# Patient Record
Sex: MALE | Race: WHITE
[De-identification: names, ages, dates, MRNs, and addresses within clinical notes are randomized per-mention and may not be internally consistent; named-entity substitution may affect disease eponyms.]

---

## 2020-02-26 PROBLEM — Z00.00 ENCOUNTER FOR PREVENTIVE HEALTH EXAMINATION: Status: ACTIVE | Noted: 2020-02-26

## 2020-02-28 ENCOUNTER — APPOINTMENT (OUTPATIENT)
Dept: PULMONOLOGY | Facility: CLINIC | Age: 70
End: 2020-02-28
Payer: MEDICARE

## 2020-02-28 VITALS
OXYGEN SATURATION: 93 % | WEIGHT: 211 LBS | HEIGHT: 75.5 IN | RESPIRATION RATE: 15 BRPM | BODY MASS INDEX: 25.96 KG/M2 | SYSTOLIC BLOOD PRESSURE: 99 MMHG | DIASTOLIC BLOOD PRESSURE: 69 MMHG | TEMPERATURE: 97.9 F | HEART RATE: 87 BPM

## 2020-02-28 PROCEDURE — 99205 OFFICE O/P NEW HI 60 MIN: CPT

## 2020-02-28 NOTE — ASSESSMENT
[FreeTextEntry1] : In summary the patient is a 69-year-old male with past medical history significant for chronic obstructive pulmonary disease who presents for a pulmonary consult. The patient's physical exam is significant for expiratory wheezing bilaterally.\par \par Spirometry as well as a home sleep study and Cardio pulmonary stress test  have been ordered. The patient is instructed to followup in 2 weeks

## 2020-02-28 NOTE — REASON FOR VISIT
[Consultation] : a consultation [COPD] : COPD [Cough] : cough [Wheezing] : wheezing [Shortness of Breath] : shortness of breath

## 2020-02-28 NOTE — PHYSICAL EXAM
[No Acute Distress] : no acute distress [Normal Oropharynx] : normal oropharynx [Normal Appearance] : normal appearance [No Neck Mass] : no neck mass [Normal Rate/Rhythm] : normal rate/rhythm [Normal S1, S2] : normal s1, s2 [No Murmurs] : no murmurs [No Resp Distress] : no resp distress [Rhonchi] : rhonchi [Benign] : benign [No Abnormalities] : no abnormalities [No Clubbing] : no clubbing [Normal Gait] : normal gait [No Cyanosis] : no cyanosis [No Edema] : no edema [FROM] : FROM [Normal Color/ Pigmentation] : normal color/ pigmentation [Oriented x3] : oriented x3 [No Focal Deficits] : no focal deficits [Normal Affect] : normal affect

## 2020-02-28 NOTE — REVIEW OF SYSTEMS
[Cough] : cough [Hemoptysis] : no hemoptysis [Chest Tightness] : chest tightness [Frequent URIs] : no frequent URIs [Dyspnea] : dyspnea [Sputum] : sputum [Pleuritic Pain] : pleuritic pain [Wheezing] : wheezing [A.M. Dry Mouth] : no a.m. dry mouth [SOB on Exertion] : sob on exertion [Negative] : Endocrine

## 2020-02-28 NOTE — HISTORY OF PRESENT ILLNESS
[Never] : never [TextBox_4] : Patient  is a 69-year-old male with past medical history significant for chronic obstructive pulmonary disease who presents for a pulmonary consult. The patient is a poor historian and the history is mostly from the chart. The patient presents to planning of increasing cough shortness of breath and dyspnea on exertion. He denies fevers chills chest pain weight loss or hemoptysis

## 2021-11-16 ENCOUNTER — APPOINTMENT (OUTPATIENT)
Dept: PULMONOLOGY | Facility: CLINIC | Age: 71
End: 2021-11-16
Payer: MEDICARE

## 2021-11-16 VITALS
OXYGEN SATURATION: 92 % | DIASTOLIC BLOOD PRESSURE: 68 MMHG | TEMPERATURE: 98.2 F | SYSTOLIC BLOOD PRESSURE: 104 MMHG | HEART RATE: 92 BPM | RESPIRATION RATE: 16 BRPM

## 2021-11-16 PROCEDURE — 99214 OFFICE O/P EST MOD 30 MIN: CPT

## 2021-11-23 ENCOUNTER — APPOINTMENT (OUTPATIENT)
Dept: PULMONOLOGY | Facility: CLINIC | Age: 71
End: 2021-11-23
Payer: MEDICARE

## 2021-11-23 PROCEDURE — 94729 DIFFUSING CAPACITY: CPT

## 2021-11-23 PROCEDURE — 94726 PLETHYSMOGRAPHY LUNG VOLUMES: CPT

## 2021-11-23 PROCEDURE — 94621 CARDIOPULM EXERCISE TESTING: CPT

## 2021-11-23 PROCEDURE — 94060 EVALUATION OF WHEEZING: CPT | Mod: 59

## 2021-12-08 NOTE — REVIEW OF SYSTEMS
[Cough] : cough [Chest Tightness] : chest tightness [Sputum] : sputum [Dyspnea] : dyspnea [Pleuritic Pain] : pleuritic pain [Wheezing] : wheezing [SOB on Exertion] : sob on exertion [Negative] : Endocrine [Hemoptysis] : no hemoptysis [Frequent URIs] : no frequent URIs [A.M. Dry Mouth] : no a.m. dry mouth

## 2021-12-08 NOTE — HISTORY OF PRESENT ILLNESS
[Former] : former [Never] : never [TextBox_4] : Patient is a 71-year-old male with COPD who presents today for follow-up.  The patient presents complaining of shortness of breath and dyspnea on exertion.  He states that he can only ambulate approximately 10 to 20 feet without becoming dyspneic.  He currently denies fevers chills chest pain weight loss or hemoptysis.  The patient presents today requesting home oxygen

## 2021-12-08 NOTE — ASSESSMENT
[FreeTextEntry1] : In summary patient is a 71-year-old male with COPD who presents for follow-up.  The patient is found to be in moderate respiratory distress.  The patient was treated with Solu-Medrol 125 mg IV push and placed on 2 L nasal cannula.  His oxygen improved.  The patient is started on Breztri and he is now scheduled for a PFT and Cardio Pulmonary Stress Test\par On presentation to the office the patient's oxygen saturation was found to be 93% on room air.  He subsequently desaturated to 83% with ambulation.\par \par The patient was placed on 4 L nasal cannula and his oxygen subsequently increased at rest to 92% and therefore the patient qualifies for home oxygen

## 2022-01-14 ENCOUNTER — NON-APPOINTMENT (OUTPATIENT)
Age: 72
End: 2022-01-14

## 2022-04-19 ENCOUNTER — NON-APPOINTMENT (OUTPATIENT)
Age: 72
End: 2022-04-19

## 2022-04-19 ENCOUNTER — APPOINTMENT (OUTPATIENT)
Dept: PULMONOLOGY | Facility: CLINIC | Age: 72
End: 2022-04-19
Payer: MEDICARE

## 2022-04-19 VITALS
OXYGEN SATURATION: 95 % | WEIGHT: 197 LBS | TEMPERATURE: 98.1 F | DIASTOLIC BLOOD PRESSURE: 67 MMHG | HEIGHT: 75 IN | SYSTOLIC BLOOD PRESSURE: 104 MMHG | BODY MASS INDEX: 24.49 KG/M2 | HEART RATE: 83 BPM

## 2022-04-19 PROCEDURE — 99214 OFFICE O/P EST MOD 30 MIN: CPT

## 2022-04-19 NOTE — REVIEW OF SYSTEMS
[Cough] : cough [Hemoptysis] : no hemoptysis [Chest Tightness] : chest tightness [Frequent URIs] : no frequent URIs [Sputum] : sputum [Dyspnea] : dyspnea [Pleuritic Pain] : pleuritic pain [Wheezing] : wheezing [A.M. Dry Mouth] : no a.m. dry mouth [SOB on Exertion] : sob on exertion [Negative] : Endocrine

## 2022-04-19 NOTE — ASSESSMENT
[FreeTextEntry1] : In summary patient is a 71-year-old male with COPD who presents for follow-up.  The patient's physical exam is significant for scattered wheezing bilaterally.  I had a lengthy discussion with the patient.  Prescription renewal performed.  Patient is started on Stiolto and instructed to follow-up in 3 months

## 2022-04-19 NOTE — HISTORY OF PRESENT ILLNESS
[Former] : former [Never] : never [TextBox_4] : Patient is a 71-year-old male with past medical history significant for COPD who presents today for follow-up.  Patient presents complaining of increasing cough shortness of breath and dyspnea on exertion.  He currently denies fevers chills chest pain weight loss or hemoptysis

## 2023-06-01 ENCOUNTER — APPOINTMENT (OUTPATIENT)
Dept: PULMONOLOGY | Facility: CLINIC | Age: 73
End: 2023-06-01
Payer: MEDICARE

## 2023-06-01 VITALS
HEART RATE: 80 BPM | DIASTOLIC BLOOD PRESSURE: 64 MMHG | RESPIRATION RATE: 18 BRPM | SYSTOLIC BLOOD PRESSURE: 88 MMHG | OXYGEN SATURATION: 83 % | TEMPERATURE: 98.1 F

## 2023-06-01 PROCEDURE — 96374 THER/PROPH/DIAG INJ IV PUSH: CPT

## 2023-06-01 PROCEDURE — 99214 OFFICE O/P EST MOD 30 MIN: CPT | Mod: 25

## 2023-06-01 RX ORDER — LEVOFLOXACIN 500 MG/1
500 TABLET, FILM COATED ORAL DAILY
Qty: 7 | Refills: 3 | Status: ACTIVE | COMMUNITY
Start: 2023-06-01 | End: 1900-01-01

## 2023-06-01 NOTE — ASSESSMENT
[FreeTextEntry1] : In summary patient is a 72-year-old male with COPD malnutrition anxiety depression who presents for an acute visit.  The patient was found to be an extremis he was placed on 8 L nasal cannula Solu-Medrol 125 mg IV push was administered.  The patient was treated with bronchodilator therapy via nebulization.  He was given albuterol 0.063 mg and 3 cc of normal saline.  I had a lengthy discussion with the patient a chest x-ray has been ordered.  I have ordered antibiotics as well as bronchodilators and oral steroids I requested that the patient be sent to the emergency room but he refused.  He is instructed to continue current therapy and follow-up in 5 days

## 2023-06-01 NOTE — HISTORY OF PRESENT ILLNESS
[Former] : former [Never] : never [TextBox_4] : Patient is a 72-year-old male past medical history significant for COPD former smoker history of anxiety and depression who presents for an acute visit.  The patient is found to be an extremis using the accessory muscles with audible wheezing

## 2023-06-08 ENCOUNTER — NON-APPOINTMENT (OUTPATIENT)
Age: 73
End: 2023-06-08

## 2023-06-13 ENCOUNTER — APPOINTMENT (OUTPATIENT)
Dept: PULMONOLOGY | Facility: CLINIC | Age: 73
End: 2023-06-13
Payer: MEDICARE

## 2023-06-13 VITALS
HEIGHT: 75 IN | OXYGEN SATURATION: 84 % | DIASTOLIC BLOOD PRESSURE: 82 MMHG | SYSTOLIC BLOOD PRESSURE: 127 MMHG | WEIGHT: 197 LBS | HEART RATE: 14 BPM | RESPIRATION RATE: 18 BRPM | TEMPERATURE: 98.1 F | BODY MASS INDEX: 24.49 KG/M2

## 2023-06-13 PROCEDURE — 99214 OFFICE O/P EST MOD 30 MIN: CPT | Mod: 25

## 2023-06-13 PROCEDURE — 96374 THER/PROPH/DIAG INJ IV PUSH: CPT

## 2023-06-20 ENCOUNTER — APPOINTMENT (OUTPATIENT)
Dept: PULMONOLOGY | Facility: CLINIC | Age: 73
End: 2023-06-20

## 2023-06-30 NOTE — ASSESSMENT
[FreeTextEntry1] : In summary patient is a 72-year-old male with COPD malnutrition anxiety depression who presents for an acute visit.  The patient continues to have scattered rhonchi and difficulty breathing he was placed on 4 L nasal cannula and treated with nebulized albuterol Solu-Medrol 125 mg IV push given.  He is instructed to continue current medications and follow-up in 2 weeks

## 2023-06-30 NOTE — HISTORY OF PRESENT ILLNESS
[Former] : former [Never] : never [TextBox_4] : Patient is a 73-year-old male past medical history significant for COPD former smoker anxiety depression who presents today for follow-up.  The patient still complains of cough and shortness of breath by the use of his antibiotics and inhalers.  There seems to be a deterioration in the patient's short-term memory at times.  He currently denies fevers chills chest pain weight loss or hemoptysis

## 2023-08-23 RX ORDER — PREDNISONE 20 MG/1
20 TABLET ORAL DAILY
Qty: 60 | Refills: 0 | Status: ACTIVE | COMMUNITY
Start: 2023-06-01 | End: 1900-01-01

## 2023-08-23 RX ORDER — TIOTROPIUM BROMIDE AND OLODATEROL 3.124; 2.736 UG/1; UG/1
2.5-2.5 SPRAY, METERED RESPIRATORY (INHALATION)
Qty: 1 | Refills: 3 | Status: ACTIVE | COMMUNITY
Start: 2022-04-19 | End: 1900-01-01

## 2023-08-23 RX ORDER — PROMETHAZINE HYDROCHLORIDE 6.25 MG/5ML
6.25 SOLUTION ORAL EVERY 6 HOURS
Qty: 1 | Refills: 0 | Status: ACTIVE | COMMUNITY
Start: 2022-03-03 | End: 1900-01-01

## 2023-08-24 ENCOUNTER — APPOINTMENT (OUTPATIENT)
Dept: PULMONOLOGY | Facility: CLINIC | Age: 73
End: 2023-08-24
Payer: MEDICARE

## 2023-08-24 VITALS
OXYGEN SATURATION: 93 % | BODY MASS INDEX: 21.76 KG/M2 | WEIGHT: 175 LBS | SYSTOLIC BLOOD PRESSURE: 84 MMHG | HEIGHT: 75 IN | DIASTOLIC BLOOD PRESSURE: 55 MMHG | RESPIRATION RATE: 18 BRPM | HEART RATE: 93 BPM

## 2023-08-24 PROCEDURE — 96374 THER/PROPH/DIAG INJ IV PUSH: CPT

## 2023-08-24 PROCEDURE — 99215 OFFICE O/P EST HI 40 MIN: CPT | Mod: 25

## 2023-08-31 ENCOUNTER — APPOINTMENT (OUTPATIENT)
Dept: PULMONOLOGY | Facility: CLINIC | Age: 73
End: 2023-08-31
Payer: MEDICARE

## 2023-08-31 VITALS
OXYGEN SATURATION: 93 % | SYSTOLIC BLOOD PRESSURE: 109 MMHG | TEMPERATURE: 98.1 F | HEIGHT: 75 IN | DIASTOLIC BLOOD PRESSURE: 79 MMHG | BODY MASS INDEX: 21.76 KG/M2 | WEIGHT: 175 LBS | RESPIRATION RATE: 16 BRPM | HEART RATE: 77 BPM

## 2023-08-31 DIAGNOSIS — Z87.09 PERSONAL HISTORY OF OTHER DISEASES OF THE RESPIRATORY SYSTEM: ICD-10-CM

## 2023-08-31 DIAGNOSIS — Z78.9 OTHER SPECIFIED HEALTH STATUS: ICD-10-CM

## 2023-08-31 DIAGNOSIS — E44.0 MODERATE PROTEIN-CALORIE MALNUTRITION: ICD-10-CM

## 2023-08-31 DIAGNOSIS — R05.9 COUGH, UNSPECIFIED: ICD-10-CM

## 2023-08-31 PROCEDURE — 99214 OFFICE O/P EST MOD 30 MIN: CPT

## 2023-09-14 ENCOUNTER — APPOINTMENT (OUTPATIENT)
Dept: PULMONOLOGY | Facility: CLINIC | Age: 73
End: 2023-09-14

## 2023-10-03 ENCOUNTER — APPOINTMENT (OUTPATIENT)
Dept: PULMONOLOGY | Facility: CLINIC | Age: 73
End: 2023-10-03
Payer: MEDICARE

## 2023-10-03 VITALS
RESPIRATION RATE: 14 BRPM | OXYGEN SATURATION: 98 % | BODY MASS INDEX: 21.88 KG/M2 | SYSTOLIC BLOOD PRESSURE: 120 MMHG | DIASTOLIC BLOOD PRESSURE: 78 MMHG | HEART RATE: 67 BPM | WEIGHT: 176 LBS | HEIGHT: 75 IN

## 2023-10-03 DIAGNOSIS — J44.9 CHRONIC OBSTRUCTIVE PULMONARY DISEASE, UNSPECIFIED: ICD-10-CM

## 2023-10-03 DIAGNOSIS — R06.02 SHORTNESS OF BREATH: ICD-10-CM

## 2023-10-03 PROCEDURE — 99214 OFFICE O/P EST MOD 30 MIN: CPT

## 2023-10-17 PROBLEM — Z78.9 NON-SMOKER: Status: ACTIVE | Noted: 2020-02-28

## 2023-10-17 PROBLEM — Z87.09 HISTORY OF COPD: Status: RESOLVED | Noted: 2021-11-16 | Resolved: 2023-10-17

## 2023-10-17 PROBLEM — E44.0 MODERATE PROTEIN-CALORIE MALNUTRITION (WEIGHT FOR AGE 60-74% OF STANDARD): Status: ACTIVE | Noted: 2023-06-01

## 2023-10-17 PROBLEM — R05.9 COUGH: Status: ACTIVE | Noted: 2022-03-03

## 2023-11-07 ENCOUNTER — APPOINTMENT (OUTPATIENT)
Dept: PULMONOLOGY | Facility: CLINIC | Age: 73
End: 2023-11-07